# Patient Record
Sex: FEMALE | Race: WHITE | Employment: FULL TIME | URBAN - METROPOLITAN AREA
[De-identification: names, ages, dates, MRNs, and addresses within clinical notes are randomized per-mention and may not be internally consistent; named-entity substitution may affect disease eponyms.]

---

## 2020-01-28 ENCOUNTER — TELEPHONE (OUTPATIENT)
Dept: NEUROLOGY | Facility: CLINIC | Age: 45
End: 2020-01-28

## 2020-01-28 NOTE — TELEPHONE ENCOUNTER
[facility] Call Center    Phone Message    May a detailed message be left on voicemail: yes    Reason for Call: Other: Pt called and requested to speak with a nurse to get more information about the botox for migraine with Dr. Moyer. Please call back pt. Thanks.     Action Taken: Message routed to:  [facility] ([facility] NEURO

## 2020-01-29 NOTE — TELEPHONE ENCOUNTER
[facility] Call Center    Phone Message    May a detailed message be left on voicemail: yes    Reason for Call: Other: Felipa returning Maryam's call.  She states that the original rep did not capture her question correctly.  She has been recieving Botox treatments from Dr. Rodriguez at [facility] for the last 7 years and has to change providers due to a change in her insurance.  She states she is due to have her next injection around 3/17.  She has an appointment with Dr. Moyer on 3/18 for an initial consult.  She is hoping to have her Botox injection on that same day if possible or real close to that date.  Felipa was also put on the wait list in case there are any cancellations to have her consult sooner so she can then have her Botox on time.  Please call Felipa back to advise     Action Taken: Message routed to:  [facility] ([facility]  Neurology

## 2020-01-29 NOTE — TELEPHONE ENCOUNTER
"Patient is very concerned that she will not get to have an injection when she sees Dr. Moyer. She is due for her injection at the same time as her initial consult appointment with Dr. Moyer. Her insurance had changed and that is why she needed to get a different provider.  She has [employer]. I explained that usually the doctor sees the patient and then orders the medication and the prior authorization step proceeds and the patient obtains the injection at the next visit.     She is asking to be \"squeezed in\". I told her that I do not have any special hidden clinic appointments. She asked if Dr. Moyer sees patients in the evening or on the weekend. I told her that I do not do scheduling and what is shown is all that I know to exist.    She wants to know how other people handle this as this cannot be the first time this situation has come up. I told her I would let Yareli know on Monday and see if she had any recommendations. She looks forward to speaking with Yareli.  "

## 2020-01-29 NOTE — TELEPHONE ENCOUNTER
RECORDS RECEIVED FROM: Self   Date of Appt: 3/18/20   NOTES (FOR ALL VISITS) STATUS DETAILS   OFFICE NOTE from referring provider N/A    OFFICE NOTE from other specialist Care Everywhere Dr Rodriguez @ [facility]:  12/17/19 *botox*  9/18/19 *botox*  6/24/19 *botox*  6/24/19 4/1/19 *botox*  1/7/19 *botox*  9/27/18 *botox*  6/20/18 *botox*  6/20/18  (additional encounters in Care Everywhere)   DISCHARGE SUMMARY from hospital N/A    DISCHARGE REPORT from the ER N/A    OPERATIVE REPORT N/A    MEDICATION LIST Care Everywhere    IMAGING  (FOR ALL VISITS)     EMG N/A    EEG N/A    MRI (HEAD, NECK, SPINE) In process [facility]:  MRI Lumbar Spine 1/22/17  MRI Cervical Spine 1/20/17   LUMBAR PUNCTURE N/A    CARLITOS Scan N/A    CT (HEAD, NECK, SPINE) N/A       Action 1/29/20 MV 3.23pm   Action Taken Imaging request faxed to [facility] for:  MRI Lumbar Spine 1/22/17  MRI Cervical Spine 1/20/17     -3/12/2020-2nd request made via call for Images to [facility] for 1/22/2017 images and are now in PACS-MR @229pm

## 2020-01-29 NOTE — TELEPHONE ENCOUNTER
Called patient to discuss her questions. Left voice mail to go online to:    [email or web address]    Also said to call [phone] if she'd like to schedule a consult with Dr. Moyer.

## 2020-02-24 NOTE — TELEPHONE ENCOUNTER
[facility] Call Center    Phone Message    May a detailed message be left on voicemail: yes     Reason for Call: Other: Felipa calling back. She states someone was going to call her back to see if she could be seen sooner. Please call her back as soon as possible to discuss..     Action Taken: Message routed to:  [facility]  neuro     Travel Screening: Not Applicable

## 2020-03-18 ENCOUNTER — PRE VISIT (OUTPATIENT)
Dept: NEUROLOGY | Facility: CLINIC | Age: 45
End: 2020-03-18

## 2020-03-18 ENCOUNTER — OFFICE VISIT (OUTPATIENT)
Dept: NEUROLOGY | Facility: CLINIC | Age: 45
End: 2020-03-18
Payer: COMMERCIAL

## 2020-03-18 VITALS
DIASTOLIC BLOOD PRESSURE: 82 MMHG | OXYGEN SATURATION: 97 % | TEMPERATURE: 98.1 F | SYSTOLIC BLOOD PRESSURE: 122 MMHG | HEART RATE: 67 BPM | RESPIRATION RATE: 16 BRPM

## 2020-03-18 DIAGNOSIS — G43.E09 CHRONIC MIGRAINE WITH AURA: Primary | ICD-10-CM

## 2020-03-18 PROBLEM — G89.29 CHRONIC BILATERAL LOW BACK PAIN WITH RIGHT-SIDED SCIATICA: Status: ACTIVE | Noted: 2017-01-12

## 2020-03-18 PROBLEM — Z98.890 HISTORY OF LUMBAR LAMINECTOMY: Status: ACTIVE | Noted: 2018-06-20

## 2020-03-18 PROBLEM — M54.41 CHRONIC BILATERAL LOW BACK PAIN WITH RIGHT-SIDED SCIATICA: Status: ACTIVE | Noted: 2017-01-12

## 2020-03-18 PROBLEM — M54.2 NECK PAIN, CHRONIC: Status: ACTIVE | Noted: 2017-01-12

## 2020-03-18 PROBLEM — G89.29 NECK PAIN, CHRONIC: Status: ACTIVE | Noted: 2017-01-12

## 2020-03-18 RX ORDER — ONABOTULINUMTOXINA 200 [USP'U]/1
200 INJECTION, POWDER, LYOPHILIZED, FOR SOLUTION INTRADERMAL; INTRAMUSCULAR
Qty: 200 UNITS | Refills: 11
Start: 2020-03-18

## 2020-03-18 RX ORDER — COVID-19 ANTIGEN TEST
KIT MISCELLANEOUS
COMMUNITY

## 2020-03-18 RX ORDER — DM/P-EPHED/ACETAMINOPH/DOXYLAM
5000 LIQUID (ML) ORAL
COMMUNITY

## 2020-03-18 RX ORDER — METOCLOPRAMIDE 5 MG/1
TABLET ORAL
COMMUNITY
Start: 2019-06-24

## 2020-03-18 RX ORDER — RIZATRIPTAN BENZOATE 10 MG/1
TABLET, ORALLY DISINTEGRATING ORAL
COMMUNITY
Start: 2019-06-25 | End: 2020-03-18

## 2020-03-18 RX ORDER — ASCORBIC ACID 100 MG
TABLET,CHEWABLE ORAL
COMMUNITY

## 2020-03-18 RX ORDER — RIZATRIPTAN BENZOATE 10 MG/1
TABLET, ORALLY DISINTEGRATING ORAL
Qty: 18 TABLET | Refills: 11 | Status: SHIPPED | OUTPATIENT
Start: 2020-03-18

## 2020-03-18 ASSESSMENT — PAIN SCALES - GENERAL: PAINLEVEL: NO PAIN (0)

## 2020-03-18 NOTE — NURSING NOTE
Chief Complaint   Patient presents with     Headache     ump new headache consult        Pete Walter, EMT

## 2020-03-18 NOTE — LETTER
3/18/2020       RE: Felipa Castle  [address]  [place] [address]     Dear Colleague,    Thank you for referring your patient, Felipa Castle, to the [facility] NEUROLOGY at [facility]. Please see a copy of my visit note below.    [facility]   [facility]  Neurology Consult     Felipa Castle MRN# [id number]   YOB: 1975 Age: 44 year old     Requesting physician: self-referred         Assessment and Recommendations:   Felipa Castle is a 44 year old female who presents to the neurology clinic for further evaluation of headache.    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  Her neurologic exam is intact today.  She has had previous head imaging and 2013, which is reportedly normal.  I did not recommend any further work-up for secondary causes of headache today.  I suspect that she has migraine on a genetic basis, as there is a family history in her mother and a niece.    Going forward, we discussed continuing her previously successful symptomatic treatment strategy.  -For acute treatment of mild headache, she will continue naproxen 440 mg twice a day as needed, not to exceed more than 14 days/month to avoid medication overuse.  - For acute treatment of moderate to severe headache, she will continue rizatriptan 10 mg oral dissolvable tablet taken at the onset of headache, with a repeat dose in 2 hours if needed.  This should not exceed more than 9 days/month to avoid medication overuse.  - For nausea, she will continue metoclopramide 10 mg as needed, not to exceed more than 9 days/month.    For headache prevention, we will attempt to continue botulinum toxin injections, of these have been successful in reducing her headaches by least 50%.  She had her most recent set of injections earlier this week, and we will try to set these up for her in 12 weeks  "time.    I spent 35 minutes with the patient, over half of which was counseling.  I will plan to see her back for the first set of injections.    Shelley Moyer MD  Neurology  Pager: [phone]            Chief Complaint:   Chief Complaint   Patient presents with     Headache     [facility] new headache consult            History is obtained from the patient and medical record.      Felipa Castle is a 44 year old female who is been having headaches since October 2012.  They were initially daily for 6 months, and have remained frequent since that time.  Currently, she has between 6 and 10 severe headache days a month, with more mild headaches on other days.  This is in the setting of botulinum toxin injections for chronic migraine.  Prior to this, she was having greater than 15 headache days a month.    Her headaches generally start as neck tension and radiate forward to the front and top of her head bilaterally.  This used to be unilateral, but is gradually changed over time.  If headache is mild, she describes it as aching, this worsens to throbbing when severe.  Her headaches last at least 4 hours.  Her headache attacks are associated with photophobia, phonophobia, nausea, and sometimes vomiting.    She gets visual aura consisting of flashing lights and shadows.  She also experiences a smell like eggs or cooking.    She denies any positional component to her pain.  She denies autonomic features.  She denies any fever or other illness associated with her headaches.    For treatment, she currently takes naproxen for more mild headache as needed, rizatriptan for moderate to severe headache, and Reglan for nausea.      For prevention of headache, she currently receives botulinum toxin injections every 12 weeks using a chronic migraine protocol.  She tells me she gets 200 units with each set of injections.    Per chart review she receives the following units,    \"Procerus: 5 units in a single injection.   : 10 " "units total, 5 units per site.   Frontalis: 15 units total; divided in 4 sites.   Temporalis: 40 units total; divided in 8 sites.   Occipitalis: 60 units total; divided in 6 sites.   Cervical paraspinals: 40 units total; divided in 4 sites.   Trapezius: 30 units total; divided in 6 sites.\"    Per chart review, previous headache treatments have included topiramate which caused nephrolithiasis, trigger point injections, and hormonal management.  She has tried sumatriptan and rizatriptan in the past, which usually of benefit early on but then lose efficacy quickly.            Past Medical History:   Kidney stone  Low back pain/sciatica  Occasional constipation  Neck pain          Past Surgical History:   Low back surgery, diskectomy, laminectomy   Tonsillectomy   Urethra lengthened at age 5 due to recurrent UTI with pseudomonas  [place] teeth removed          Social History:   Unmarried, one child.  Employed as a .    Non-smoker, drinks alcohol occasionally, denies drug use.   Caffeine intake: 1 cup of coffee in the morning          Family History:   Mother, niece with migraine          Allergies:      Allergies   Allergen Reactions     Erythromycin Other (See Comments)     Other reaction(s): *Unknown - Childhood Rxn  PN: Unknown-too young       Minocycline Itching     PN: total body itching       Sulfamethoxazole-Trimethoprim Rash             Medications:     Current Outpatient Medications:      Ascorbic Acid (VITAMIN C) 100 MG CHEW, , Disp: , Rfl:      cholecalciferol (D 5000) 125 MCG (5000 UT) CAPS, Take 5,000 Units by mouth, Disp: , Rfl:      metoclopramide (REGLAN) 5 MG tablet, 1 tablet up to 2 times a day for nausea related migraine. Max 2-3 days per week or 9 days per month., Disp: , Rfl:      naproxen sodium 220 MG capsule, as needed., Disp: , Rfl:      rizatriptan (MAXALT-MLT) 10 MG ODT, DISSOLVE 1 TABLET ON THE TONGUE AT ONSET OF TYPICAL MIGRAINE. MAY REPEAT IN 1-2 HOURS. MAX OF 2 TABLETS PER DAY, " Disp: , Rfl:           Review of Systems:   Negative, except for shoulder tension, numbness on right lateral leg from previous back surgery, and as noted in the HPI.         Physical Exam:   /82   Pulse 67   Temp 98.1  F (36.7  C) (Oral)   Resp 16   SpO2 97%    Physical Exam:   General: NAD  Neurologic:   Mental Status Exam: Alert, awake and oriented to situation. No dysarthria. Speech of normal fluency.   Cranial Nerves: EOMs intact, no nystagmus, facial movements symmetric, hearing intact to conversation, tongue midline and fully mobile. No tongue atrophy or fasciculations.    Motor: No atrophy or fasciculations.  Able to hold all limbs against gravity.  Able to toe and heel walk.  Able to stand from a sitting position without use of arms.  No tremors or abnormal movements noted.    Coordination: Finger-nose-finger intact bilaterally.  Rapidly alternating movements intact bilaterally in the upper extremities.  Normal finger tapping bilaterally.  Normal Romberg.   Reflexes: 2+ and symmetric in triceps, biceps, brachioradialis, patellar.   Gait: Normal gait.  Able to toe and heel walk.  Tandem gait normal.  Head: Normocephalic, atraumatic.  Neck: Normal range of motion with lateral head movements and neck flexion.  Eyes: No conjunctival injection, no scleral icterus.   Respiratory: No increased work of breathing.  Cardiovascular: No lower extremity edema.          Data:     MRI of the brain with and without contrast completed 01/02/2013 interpreted as normal.           Again, thank you for allowing me to participate in the care of your patient.      Sincerely,    Shelley Moyer MD

## 2020-03-18 NOTE — PROGRESS NOTES
[facility]  Neurology Consult     Felipa Castle MRN# [id number]   YOB: 1975 Age: 44 year old     Requesting physician: self-referred         Assessment and Recommendations:   Felipa Castle is a 44 year old female who presents to the neurology clinic for further evaluation of headache.    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  Her neurologic exam is intact today.  She has had previous head imaging and 2013, which is reportedly normal.  I did not recommend any further work-up for secondary causes of headache today.  I suspect that she has migraine on a genetic basis, as there is a family history in her mother and a niece.    Going forward, we discussed continuing her previously successful symptomatic treatment strategy.  -For acute treatment of mild headache, she will continue naproxen 440 mg twice a day as needed, not to exceed more than 14 days/month to avoid medication overuse.  - For acute treatment of moderate to severe headache, she will continue rizatriptan 10 mg oral dissolvable tablet taken at the onset of headache, with a repeat dose in 2 hours if needed.  This should not exceed more than 9 days/month to avoid medication overuse.  - For nausea, she will continue metoclopramide 10 mg as needed, not to exceed more than 9 days/month.    For headache prevention, we will attempt to continue botulinum toxin injections, of these have been successful in reducing her headaches by least 50%.  She had her most recent set of injections earlier this week, and we will try to set these up for her in 12 weeks time.    I spent 35 minutes with the patient, over half of which was counseling.  I will plan to see her back for the first set of injections.    Shelley Moyer MD  Neurology  Pager: [phone]            Chief Complaint:   Chief Complaint   Patient presents with     Headache     [facility]  "new headache consult            History is obtained from the patient and medical record.      Felipa Castle is a 44 year old female who is been having headaches since October 2012.  They were initially daily for 6 months, and have remained frequent since that time.  Currently, she has between 6 and 10 severe headache days a month, with more mild headaches on other days.  This is in the setting of botulinum toxin injections for chronic migraine.  Prior to this, she was having greater than 15 headache days a month.    Her headaches generally start as neck tension and radiate forward to the front and top of her head bilaterally.  This used to be unilateral, but is gradually changed over time.  If headache is mild, she describes it as aching, this worsens to throbbing when severe.  Her headaches last at least 4 hours.  Her headache attacks are associated with photophobia, phonophobia, nausea, and sometimes vomiting.    She gets visual aura consisting of flashing lights and shadows.  She also experiences a smell like eggs or cooking.    She denies any positional component to her pain.  She denies autonomic features.  She denies any fever or other illness associated with her headaches.    For treatment, she currently takes naproxen for more mild headache as needed, rizatriptan for moderate to severe headache, and Reglan for nausea.      For prevention of headache, she currently receives botulinum toxin injections every 12 weeks using a chronic migraine protocol.  She tells me she gets 200 units with each set of injections.    Per chart review she receives the following units,    \"Procerus: 5 units in a single injection.   : 10 units total, 5 units per site.   Frontalis: 15 units total; divided in 4 sites.   Temporalis: 40 units total; divided in 8 sites.   Occipitalis: 60 units total; divided in 6 sites.   Cervical paraspinals: 40 units total; divided in 4 sites.   Trapezius: 30 units total; divided in 6 " "sites.\"    Per chart review, previous headache treatments have included topiramate which caused nephrolithiasis, trigger point injections, and hormonal management.  She has tried sumatriptan and rizatriptan in the past, which usually of benefit early on but then lose efficacy quickly.            Past Medical History:   Kidney stone  Low back pain/sciatica  Occasional constipation  Neck pain          Past Surgical History:   Low back surgery, diskectomy, laminectomy   Tonsillectomy   Urethra lengthened at age 5 due to recurrent UTI with pseudomonas  [place] teeth removed          Social History:   Unmarried, one child.  Employed as a .    Non-smoker, drinks alcohol occasionally, denies drug use.   Caffeine intake: 1 cup of coffee in the morning          Family History:   Mother, niece with migraine          Allergies:      Allergies   Allergen Reactions     Erythromycin Other (See Comments)     Other reaction(s): *Unknown - Childhood Rxn  PN: Unknown-too young       Minocycline Itching     PN: total body itching       Sulfamethoxazole-Trimethoprim Rash             Medications:     Current Outpatient Medications:      Ascorbic Acid (VITAMIN C) 100 MG CHEW, , Disp: , Rfl:      cholecalciferol (D 5000) 125 MCG (5000 UT) CAPS, Take 5,000 Units by mouth, Disp: , Rfl:      metoclopramide (REGLAN) 5 MG tablet, 1 tablet up to 2 times a day for nausea related migraine. Max 2-3 days per week or 9 days per month., Disp: , Rfl:      naproxen sodium 220 MG capsule, as needed., Disp: , Rfl:      rizatriptan (MAXALT-MLT) 10 MG ODT, DISSOLVE 1 TABLET ON THE TONGUE AT ONSET OF TYPICAL MIGRAINE. MAY REPEAT IN 1-2 HOURS. MAX OF 2 TABLETS PER DAY, Disp: , Rfl:           Review of Systems:   Negative, except for shoulder tension, numbness on right lateral leg from previous back surgery, and as noted in the HPI.         Physical Exam:   /82   Pulse 67   Temp 98.1  F (36.7  C) (Oral)   Resp 16   SpO2 97%    Physical Exam: "   General: NAD  Neurologic:   Mental Status Exam: Alert, awake and oriented to situation. No dysarthria. Speech of normal fluency.   Cranial Nerves: EOMs intact, no nystagmus, facial movements symmetric, hearing intact to conversation, tongue midline and fully mobile. No tongue atrophy or fasciculations.    Motor: No atrophy or fasciculations.  Able to hold all limbs against gravity.  Able to toe and heel walk.  Able to stand from a sitting position without use of arms.  No tremors or abnormal movements noted.    Coordination: Finger-nose-finger intact bilaterally.  Rapidly alternating movements intact bilaterally in the upper extremities.  Normal finger tapping bilaterally.  Normal Romberg.   Reflexes: 2+ and symmetric in triceps, biceps, brachioradialis, patellar.   Gait: Normal gait.  Able to toe and heel walk.  Tandem gait normal.  Head: Normocephalic, atraumatic.  Neck: Normal range of motion with lateral head movements and neck flexion.  Eyes: No conjunctival injection, no scleral icterus.   Respiratory: No increased work of breathing.  Cardiovascular: No lower extremity edema.          Data:     MRI of the brain with and without contrast completed 01/02/2013 interpreted as normal.

## 2020-03-18 NOTE — LETTER
3/18/2020      RE: Felipa Castle  [address]  [facility] [address]       [facility]  Neurology Consult     Felipa Castle MRN# [id number]   YOB: 1975 Age: 44 year old     Requesting physician: self-referred         Assessment and Recommendations:   Felipa Castle is a 44 year old female who presents to the neurology clinic for further evaluation of headache.    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  Her neurologic exam is intact today.  She has had previous head imaging and 2013, which is reportedly normal.  I did not recommend any further work-up for secondary causes of headache today.  I suspect that she has migraine on a genetic basis, as there is a family history in her mother and a niece.    Going forward, we discussed continuing her previously successful symptomatic treatment strategy.  -For acute treatment of mild headache, she will continue naproxen 440 mg twice a day as needed, not to exceed more than 14 days/month to avoid medication overuse.  - For acute treatment of moderate to severe headache, she will continue rizatriptan 10 mg oral dissolvable tablet taken at the onset of headache, with a repeat dose in 2 hours if needed.  This should not exceed more than 9 days/month to avoid medication overuse.  - For nausea, she will continue metoclopramide 10 mg as needed, not to exceed more than 9 days/month.    For headache prevention, we will attempt to continue botulinum toxin injections, of these have been successful in reducing her headaches by least 50%.  She had her most recent set of injections earlier this week, and we will try to set these up for her in 12 weeks time.    I spent 35 minutes with the patient, over half of which was counseling.  I will plan to see her back for the first set of injections.    Shelley Moyer MD  Neurology  Pager: [phone]             "Chief Complaint:   Chief Complaint   Patient presents with     Headache     [facility] new headache consult            History is obtained from the patient and medical record.      Felipa Castle is a 44 year old female who is been having headaches since October 2012.  They were initially daily for 6 months, and have remained frequent since that time.  Currently, she has between 6 and 10 severe headache days a month, with more mild headaches on other days.  This is in the setting of botulinum toxin injections for chronic migraine.  Prior to this, she was having greater than 15 headache days a month.    Her headaches generally start as neck tension and radiate forward to the front and top of her head bilaterally.  This used to be unilateral, but is gradually changed over time.  If headache is mild, she describes it as aching, this worsens to throbbing when severe.  Her headaches last at least 4 hours.  Her headache attacks are associated with photophobia, phonophobia, nausea, and sometimes vomiting.    She gets visual aura consisting of flashing lights and shadows.  She also experiences a smell like eggs or cooking.    She denies any positional component to her pain.  She denies autonomic features.  She denies any fever or other illness associated with her headaches.    For treatment, she currently takes naproxen for more mild headache as needed, rizatriptan for moderate to severe headache, and Reglan for nausea.      For prevention of headache, she currently receives botulinum toxin injections every 12 weeks using a chronic migraine protocol.  She tells me she gets 200 units with each set of injections.    Per chart review she receives the following units,    \"Procerus: 5 units in a single injection.   : 10 units total, 5 units per site.   Frontalis: 15 units total; divided in 4 sites.   Temporalis: 40 units total; divided in 8 sites.   Occipitalis: 60 units total; divided in 6 sites.   Cervical paraspinals: " "40 units total; divided in 4 sites.   Trapezius: 30 units total; divided in 6 sites.\"    Per chart review, previous headache treatments have included topiramate which caused nephrolithiasis, trigger point injections, and hormonal management.  She has tried sumatriptan and rizatriptan in the past, which usually of benefit early on but then lose efficacy quickly.            Past Medical History:   Kidney stone  Low back pain/sciatica  Occasional constipation  Neck pain          Past Surgical History:   Low back surgery, diskectomy, laminectomy   Tonsillectomy   Urethra lengthened at age 5 due to recurrent UTI with pseudomonas  [place] teeth removed          Social History:   Unmarried, one child.  Employed as a .    Non-smoker, drinks alcohol occasionally, denies drug use.   Caffeine intake: 1 cup of coffee in the morning          Family History:   Mother, niece with migraine          Allergies:      Allergies   Allergen Reactions     Erythromycin Other (See Comments)     Other reaction(s): *Unknown - Childhood Rxn  PN: Unknown-too young       Minocycline Itching     PN: total body itching       Sulfamethoxazole-Trimethoprim Rash             Medications:     Current Outpatient Medications:      Ascorbic Acid (VITAMIN C) 100 MG CHEW, , Disp: , Rfl:      cholecalciferol (D 5000) 125 MCG (5000 UT) CAPS, Take 5,000 Units by mouth, Disp: , Rfl:      metoclopramide (REGLAN) 5 MG tablet, 1 tablet up to 2 times a day for nausea related migraine. Max 2-3 days per week or 9 days per month., Disp: , Rfl:      naproxen sodium 220 MG capsule, as needed., Disp: , Rfl:      rizatriptan (MAXALT-MLT) 10 MG ODT, DISSOLVE 1 TABLET ON THE TONGUE AT ONSET OF TYPICAL MIGRAINE. MAY REPEAT IN 1-2 HOURS. MAX OF 2 TABLETS PER DAY, Disp: , Rfl:           Review of Systems:   Negative, except for shoulder tension, numbness on right lateral leg from previous back surgery, and as noted in the HPI.         Physical Exam:   /82   " Pulse 67   Temp 98.1  F (36.7  C) (Oral)   Resp 16   SpO2 97%    Physical Exam:   General: NAD  Neurologic:   Mental Status Exam: Alert, awake and oriented to situation. No dysarthria. Speech of normal fluency.   Cranial Nerves: EOMs intact, no nystagmus, facial movements symmetric, hearing intact to conversation, tongue midline and fully mobile. No tongue atrophy or fasciculations.    Motor: No atrophy or fasciculations.  Able to hold all limbs against gravity.  Able to toe and heel walk.  Able to stand from a sitting position without use of arms.  No tremors or abnormal movements noted.    Coordination: Finger-nose-finger intact bilaterally.  Rapidly alternating movements intact bilaterally in the upper extremities.  Normal finger tapping bilaterally.  Normal Romberg.   Reflexes: 2+ and symmetric in triceps, biceps, brachioradialis, patellar.   Gait: Normal gait.  Able to toe and heel walk.  Tandem gait normal.  Head: Normocephalic, atraumatic.  Neck: Normal range of motion with lateral head movements and neck flexion.  Eyes: No conjunctival injection, no scleral icterus.   Respiratory: No increased work of breathing.  Cardiovascular: No lower extremity edema.          Data:     MRI of the brain with and without contrast completed 01/02/2013 interpreted as normal.           Shelley Moyer MD

## 2020-06-08 ENCOUNTER — OFFICE VISIT (OUTPATIENT)
Dept: NEUROLOGY | Facility: CLINIC | Age: 45
End: 2020-06-08
Payer: COMMERCIAL

## 2020-06-08 DIAGNOSIS — G43.E09 CHRONIC MIGRAINE WITH AURA: Primary | ICD-10-CM

## 2020-06-08 NOTE — PROGRESS NOTES
[place], [place]  Botulinum Toxin Procedure    Shelley Moyer MD  Neurology    June 8, 2020    Procedure:  OnabotulinumtoxinA injections for chronic migraine  Indication:  Chronic migraine    Ms. Castle suffers from severe intractable headaches.  She was referred by Dr. Moyer for onabotulinumtoxinA injections for headache.  Risks, benefits, and alternatives were discussed.  All questions were answered and consent given.  She decided to proceed with the injections.     Prior to initiation of botulinum toxin injections, Ms. Castle reported 30 headache days per month. Her headaches are quite disabling and often interfere with her ability to function normally.    Previous headache treatments have included topiramate which caused nephrolithiasis, trigger point injections, and hormonal management.    She currently takes no other treatments for headache prevention.    Ms. Vizcainos pain was assessed prior to the procedure.  She rated her pain today as 0 out of 10.    Procedural Pause: Procedural pause was conducted to verify correct patient identity, procedure to be performed, correct side and site, correct patient position, and special requirements. Appropriate hand hygiene was utilized, and each injection site was prepped with alcohol wipes or Chloraprep swab.     Procedure Details: From lot number [id number], expiration date 11/2022, 200 units of onabotulinumtoxinA was diluted in 4 mL 0.9% normal saline, lot # -[id number]. A total of 150 units of onabotulinumtoxinA were injected using 30 gauge 0.5 in needles into the muscles listed below. 50 units of onabotulinumtoxinA were wasted.     Injection Sites: Total = 200 units onabotulinumtoxinA      and Procerus muscles - 5 units into the left and right corrugators and 5 units into the procerus (15 units total)    Frontalis muscles - 5 units into the left superior frontalis and 5 units into the right superior frontalis (2 injection  sites per muscle) (10 units total)    Temporalis muscles - 25 units into the left temporalis muscle and 25 units into the right temporalis muscle (4 injection sites per muscle) (50 units total)    Occipitalis muscles - 25 units into the left occipitalis muscle and 25 units into the right occipitalis muscle (4 injection sites per muscle) (50 units total)    Splenius Capitis muscles - 12.5 units into the left splenius capitis muscle and 12.5 units into the right splenius capitis muscle (2 injection sites per muscle, divided into 2/3 anteriorly and 1/3 posteriorly) (25 units total)      Trapezius muscles - 25 units into the left trapezius muscle and 25 units into the right trapezius muscle (3 injection sites per muscle, divided 5 units, 10 units, 10 units, medial to lateral) (50 units total)    Ms. Castle tolerated the procedure well without immediate complications.  She will follow up in clinic for assessment of the effectiveness of treatment.  She did not report any change in her pain level after the botulinumtoxinA injection procedure.    Shelley Moyer MD  Pager: [phone]    Neurology Department  [facility]  Clinics and Surgery [address] [address]

## 2020-06-08 NOTE — LETTER
6/8/2020       RE: Felipa Castle  [address]  [place] [address]     Dear Colleague,    Thank you for referring your patient, Felipa Castle, to the [facility] NEUROLOGY at [facility]. Please see a copy of my visit note below.    [place], [place]  Botulinum Toxin Procedure    Shelley Moyer MD  Neurology    June 8, 2020    Procedure:  OnabotulinumtoxinA injections for chronic migraine  Indication:  Chronic migraine    Ms. Castle suffers from severe intractable headaches.  She was referred by Dr. Moyer for onabotulinumtoxinA injections for headache.  Risks, benefits, and alternatives were discussed.  All questions were answered and consent given.  She decided to proceed with the injections.     Prior to initiation of botulinum toxin injections, Ms. Castle reported 30 headache days per month. Her headaches are quite disabling and often interfere with her ability to function normally.    Previous headache treatments have included topiramate which caused nephrolithiasis, trigger point injections, and hormonal management.    She currently takes no other treatments for headache prevention.    Ms. Vizcainos pain was assessed prior to the procedure.  She rated her pain today as 0 out of 10.    Procedural Pause: Procedural pause was conducted to verify correct patient identity, procedure to be performed, correct side and site, correct patient position, and special requirements. Appropriate hand hygiene was utilized, and each injection site was prepped with alcohol wipes or Chloraprep swab.     Procedure Details: From lot number [id number], expiration date 11/2022, 200 units of onabotulinumtoxinA was diluted in 4 mL 0.9% normal saline, lot # -[id number]. A total of 150 units of onabotulinumtoxinA were injected using 30 gauge 0.5 in needles into the muscles listed below. 50 units of onabotulinumtoxinA were wasted.     Injection Sites: Total = 200  units onabotulinumtoxinA      and Procerus muscles - 5 units into the left and right corrugators and 5 units into the procerus (15 units total)    Frontalis muscles - 5 units into the left superior frontalis and 5 units into the right superior frontalis (2 injection sites per muscle) (10 units total)    Temporalis muscles - 25 units into the left temporalis muscle and 25 units into the right temporalis muscle (4 injection sites per muscle) (50 units total)    Occipitalis muscles - 25 units into the left occipitalis muscle and 25 units into the right occipitalis muscle (4 injection sites per muscle) (50 units total)    Splenius Capitis muscles - 12.5 units into the left splenius capitis muscle and 12.5 units into the right splenius capitis muscle (2 injection sites per muscle, divided into 2/3 anteriorly and 1/3 posteriorly) (25 units total)      Trapezius muscles - 25 units into the left trapezius muscle and 25 units into the right trapezius muscle (3 injection sites per muscle, divided 5 units, 10 units, 10 units, medial to lateral) (50 units total)    Ms. Castle tolerated the procedure well without immediate complications.  She will follow up in clinic for assessment of the effectiveness of treatment.  She did not report any change in her pain level after the botulinumtoxinA injection procedure.    Shelley Moyer MD  Pager: [phone]    Neurology Department  [facility]  Clinics and Surgery [address] [address]      Again, thank you for allowing me to participate in the care of your patient.      Sincerely,    Shelley Moyer MD

## 2020-08-10 ENCOUNTER — TELEPHONE (OUTPATIENT)
Dept: NEUROLOGY | Facility: CLINIC | Age: 45
End: 2020-08-10

## 2020-08-10 NOTE — TELEPHONE ENCOUNTER
[facility] Call Center    Phone Message    May a detailed message be left on voicemail: yes     Reason for Call: Other: Pt has to go out of town for a month and she would like to reschedule her botox appt for a week earlier. She has to go out of town for work. Please call to discuss. She says she can't miss this appt.     Action Taken: Message routed to:  [facility] ([facility] neurology    Travel Screening: Not Applicable

## 2020-08-12 NOTE — TELEPHONE ENCOUNTER
[facility] Call Center    Phone Message    May a detailed message be left on voicemail: yes     Reason for Call: Other: Felipa calling to reschedule her botox appointment that is on 8/31/20 due to leaving on 8/27/20 for work and will be gone for a month. She is trying to get in a week earlier. Please call her at your earliest convenience to discuss.     Action Taken: Message routed to:  [facility] ([facility]):  NEUROLOGY    Travel Screening: Not Applicable

## 2020-08-14 ENCOUNTER — TELEPHONE (OUTPATIENT)
Dept: NEUROLOGY | Facility: CLINIC | Age: 45
End: 2020-08-14

## 2020-08-14 DIAGNOSIS — G43.E09 CHRONIC MIGRAINE WITH AURA: Primary | ICD-10-CM

## 2020-08-14 RX ORDER — METHYLPREDNISOLONE 4 MG
TABLET, DOSE PACK ORAL
Qty: 21 TABLET | Refills: 0 | Status: SHIPPED | OUTPATIENT
Start: 2020-08-14

## 2020-08-14 NOTE — TELEPHONE ENCOUNTER
"I called pt regarding her botox. She cannot have her dose early as she needs to wait 12 weeks between each dose for insurance to cover cost. Dr. Gage had a couple options for her, \"She could have occipital nerve blocks before leaving to try to cover her, or I could prescribe her a medrol claritza with instructions to use it only if she has a flare while she is out of town.\". I reviewed this with pt. She would like to try to schedule the nerve block; I will check with Dr. Moyer to see when we can get the patient seen.     Lena RN  "

## 2020-08-14 NOTE — TELEPHONE ENCOUNTER
I called pt and helped her schedule as an add-on on 8/21 at 3pm with Dr. Moyer for an occipital nerve block.     Lena ZAMBRANO

## 2020-08-21 ENCOUNTER — OFFICE VISIT (OUTPATIENT)
Dept: PHYSICAL MEDICINE AND REHAB | Facility: CLINIC | Age: 45
End: 2020-08-21
Payer: COMMERCIAL

## 2020-08-21 DIAGNOSIS — M54.81 BILATERAL OCCIPITAL NEURALGIA: Primary | ICD-10-CM

## 2020-08-21 NOTE — PROGRESS NOTES
[facility]  Physical Medicine & Rehabilitation Consultation    Felipa Castle   YOB: 1975  MRN: [id number]   Date of Service: 08/21/20    Requesting Physician: GLORIA Roque [facility] Neurology       History of Present Illness:  Felipa Castle is a 45 year old female with a history of chronic intractable headaches. For headache management, she receives Botox injections every 12 weeks. For acute migraine treatment, she uses naproxen for mild headaches, and rizatriptan for severe headaches. She uses metoclopramide for nausea associated with her headaches.     Today, she presents for occipital nerve blocks for management of occipital neuralgia. She is referred by Dr. Moyer. She currently reports a headache rated 5/10.       Physical Examination:  VS: There were no vitals taken for this visit.     Pleasant and cooperative, in no acute distress  No lesions or abrasions on exposed skin      Procedure:     Drug:   Bupivacaine 0.5%: 7 ml (Lot: [id number], Exp: 10/23, NDC: 76642-686-92)  Lidocaine 1%: 2 ml (Lot: [id number], Exp: 05/23, NDC: 27060-706-31)  Triamcinolone Acetonide: 40 mg = 1 ml (Lot: [id number], Exp: 04/22, NDC: [address]-0)    Prior to the start of the procedure and with procedural staff participation, I verbally confirmed the patient s identity using two indicators, relevant allergies, that the procedure was appropriate and matched the consent or emergent situation, and that the correct equipment/implants were available. Immediately prior to starting the procedure I conducted the Time Out with the procedural staff and re-confirmed the patient s name, procedure, and site/side. (The Joint Commission universal protocol was followed.)  Yes    Sedation (Moderate or Deep): None      Right and Left greater occipital nerve block    Area just inferior to insertion of the right and left superior trapezius insertion onto skull was cleansed with  ChloraPrep. Needle was advanced anteriorly to base of skull then slightly withdrawn and injectate was injected in a fan-like distribution at different depths. Total injection volume of 5 ml per side.       Assessment & Recommendations:  Felipa Castle is a 45 year old female who presents to rehabilitation clinic for bilateral occipital nerve blocks for management of occipital neuralgia.  Felipa Castle tolerated the procedure well and noted reduction in baseline pain following the procedure.       Thank you for this consultation. Please do not hesitate to contact me with further questions.   Yumiko West MD  Physical Medicine and Rehabilitation  [phone]    I spent a total of 10 minutes, face-to-face and managing the care of Felipa Castle, excluding the procedure. Over 50% of my time was spent counseling the patient and coordinating care. Please see note for details.

## 2020-08-21 NOTE — LETTER
8/21/2020     RE: Felipa Castle  [address]  [place] [address]     Dear Colleague,    Thank you for referring your patient, Felipa Castle, to the [facility] PHYSICAL MEDICINE AND REHABILITATION at [facility]. Please see a copy of my visit note below.    [facility]  Clinics & Surgery Center  Physical Medicine & Rehabilitation Consultation    Felipa Castle   YOB: 1975  MRN: [id number]   Date of Service: 08/21/20    Requesting Physician: Dr. Moyer [facility]       History of Present Illness:  Felipa Castle is a 45 year old female with a history of chronic intractable headaches. For headache management, she receives Botox injections every 12 weeks. For acute migraine treatment, she uses naproxen for mild headaches, and rizatriptan for severe headaches. She uses metoclopramide for nausea associated with her headaches.     Today, she presents for occipital nerve blocks for management of occipital neuralgia. She is referred by Dr. Moyer. She currently reports a headache rated 5/10.       Physical Examination:  VS: There were no vitals taken for this visit.     Pleasant and cooperative, in no acute distress  No lesions or abrasions on exposed skin      Procedure:     Drug:   Bupivacaine 0.5%: 7 ml (Lot: [id number], Exp: 10/23, NDC: 59332-399-66)  Lidocaine 1%: 2 ml (Lot: [id number], Exp: 05/23, NDC: 35177-929-95)  Triamcinolone Acetonide: 40 mg = 1 ml (Lot: [id number], Exp: 04/22, NDC: [address]-6)    Prior to the start of the procedure and with procedural staff participation, I verbally confirmed the patient s identity using two indicators, relevant allergies, that the procedure was appropriate and matched the consent or emergent situation, and that the correct equipment/implants were available. Immediately prior to starting the procedure I conducted the Time Out with the procedural staff and re-confirmed the  patient s name, procedure, and site/side. (The Joint Commission universal protocol was followed.)  Yes    Sedation (Moderate or Deep): None    Right and Left greater occipital nerve block    Area just inferior to insertion of the right and left superior trapezius insertion onto skull was cleansed with ChloraPrep. Needle was advanced anteriorly to base of skull then slightly withdrawn and injectate was injected in a fan-like distribution at different depths. Total injection volume of 5 ml per side.       Assessment & Recommendations:  Felipa Castle is a 45 year old female who presents to rehabilitation clinic for bilateral occipital nerve blocks for management of occipital neuralgia.  Felipa Castle tolerated the procedure well and noted reduction in baseline pain following the procedure.     Thank you for this consultation. Please do not hesitate to contact me with further questions.   Yumiko West MD  Physical Medicine and Rehabilitation  [phone]    I spent a total of 10 minutes, face-to-face and managing the care of Felipa Castle, excluding the procedure. Over 50% of my time was spent counseling the patient and coordinating care. Please see note for details.     Again, thank you for allowing me to participate in the care of your patient.      Sincerely,    Yumiko West MD

## 2020-09-08 RX ORDER — BUPIVACAINE HYDROCHLORIDE 5 MG/ML
7 INJECTION, SOLUTION EPIDURAL; INTRACAUDAL ONCE
Status: COMPLETED | OUTPATIENT
Start: 2020-09-08 | End: 2020-09-08

## 2020-09-08 RX ORDER — TRIAMCINOLONE ACETONIDE 40 MG/ML
40 INJECTION, SUSPENSION INTRA-ARTICULAR; INTRAMUSCULAR ONCE
Status: COMPLETED | OUTPATIENT
Start: 2020-09-08 | End: 2020-09-08

## 2020-09-08 RX ADMIN — BUPIVACAINE HYDROCHLORIDE 35 MG: 5 INJECTION, SOLUTION EPIDURAL; INTRACAUDAL at 10:55

## 2020-09-08 RX ADMIN — TRIAMCINOLONE ACETONIDE 40 MG: 40 INJECTION, SUSPENSION INTRA-ARTICULAR; INTRAMUSCULAR at 10:56

## 2020-09-25 ENCOUNTER — OFFICE VISIT (OUTPATIENT)
Dept: NEUROLOGY | Facility: CLINIC | Age: 45
End: 2020-09-25
Payer: COMMERCIAL

## 2020-09-25 DIAGNOSIS — G43.E09 CHRONIC MIGRAINE WITH AURA: Primary | ICD-10-CM

## 2020-09-25 NOTE — PROGRESS NOTES
[place], [place]  Botulinum Toxin Procedure    Shelley Moyer MD  Neurology    September 25, 2020    Procedure:  OnabotulinumtoxinA injections for chronic migraine  Indication:  Chronic migraine    Ms. Castle suffers from severe intractable headaches.  She was referred by Dr. Moyer for onabotulinumtoxinA injections for headache.  Risks, benefits, and alternatives were discussed.  All questions were answered and consent given.  She decided to proceed with the injections.     Prior to initiation of botulinum toxin injections, Ms. Castle reported 30 headache days per month, with 30 severe headache days per month. Her headaches are quite disabling and often interfere with her ability to function normally.    Ms. Castle reports 4-8 headache days per month currently, with 3-4 severe headache days per month.  She has noticed a wearing off phenomenon prior to this round of botulinum toxin injections, lasting 1 week.    Previous headache treatments have included topiramate which caused nephrolithiasis, trigger point injections, and hormonal management.     She currently takes no other treatments for headache prevention.  She receives occipital nerve blocks for occipital neuralgia.    Ms. Vizcainos pain was assessed prior to the procedure.  She rated her pain today as 0 out of 10.    Procedural Pause: Procedural pause was conducted to verify correct patient identity, procedure to be performed, correct side and site, correct patient position, and special requirements. Appropriate hand hygiene was utilized, and each injection site was prepped with alcohol wipes or Chloraprep swab.     Procedure Details: From lot number [id number], expiration date 5/2023, 200 units of onabotulinumtoxinA was diluted in 4 mL 0.9% normal saline. A total of 200 units of onabotulinumtoxinA were injected using 30 gauge 0.5 in needles into the muscles listed below. 0 units of onabotulinumtoxinA were wasted.      Injection Sites: Total = 200 units onabotulinumtoxinA       and Procerus muscles - 5 units into the left and right corrugators and 5 units into the procerus (15 units total)     Frontalis muscles - 5 units into the left superior frontalis and 5 units into the right superior frontalis (2 injection sites per muscle) (10 units total)     Temporalis muscles - 25 units into the left temporalis muscle and 25 units into the right temporalis muscle (4 injection sites per muscle) (50 units total)     Occipitalis muscles - 25 units into the left occipitalis muscle and 25 units into the right occipitalis muscle (4 injection sites per muscle) (50 units total)     Splenius Capitis muscles - 12.5 units into the left splenius capitis muscle and 12.5 units into the right splenius capitis muscle (2 injection sites per muscle, divided into 2/3 anteriorly and 1/3 posteriorly) (25 units total)                 Trapezius muscles - 25 units into the left trapezius muscle and 25 units into the right trapezius muscle (3 injection sites per muscle, divided 5 units, 10 units, 10 units, medial to lateral) (50 units total)    Ms. Castle tolerated the procedure well without immediate complications.  She will follow up in clinic for assessment of the effectiveness of treatment.  She did not report any change in her pain level after the botulinumtoxinA injection procedure.    Shelley Moyer MD  Pager: [phone]    Neurology Department  [facility] [address] [address]

## 2020-09-25 NOTE — LETTER
9/25/2020     RE: Felipa Castle  [address]  [place] [address]     Dear Colleague,    Thank you for referring your patient, Felipa Castle, to the [facility] NEUROLOGY at [facility]. Please see a copy of my visit note below.    [place], [place]  Botulinum Toxin Procedure    Shelley Moyer MD  Neurology    September 25, 2020    Procedure:  OnabotulinumtoxinA injections for chronic migraine  Indication:  Chronic migraine    Ms. Castle suffers from severe intractable headaches.  She was referred by Dr. Moyer for onabotulinumtoxinA injections for headache.  Risks, benefits, and alternatives were discussed.  All questions were answered and consent given.  She decided to proceed with the injections.     Prior to initiation of botulinum toxin injections, Ms. Castle reported 30 headache days per month, with 30 severe headache days per month. Her headaches are quite disabling and often interfere with her ability to function normally.    Ms. Castle reports 4-8 headache days per month currently, with 3-4 severe headache days per month.  She has noticed a wearing off phenomenon prior to this round of botulinum toxin injections, lasting 1 week.    Previous headache treatments have included topiramate which caused nephrolithiasis, trigger point injections, and hormonal management.     She currently takes no other treatments for headache prevention.  She receives occipital nerve blocks for occipital neuralgia.    Ms. Vizcainos pain was assessed prior to the procedure.  She rated her pain today as 0 out of 10.    Procedural Pause: Procedural pause was conducted to verify correct patient identity, procedure to be performed, correct side and site, correct patient position, and special requirements. Appropriate hand hygiene was utilized, and each injection site was prepped with alcohol wipes or Chloraprep swab.     Procedure Details: From lot number  [id number], expiration date 5/2023, 200 units of onabotulinumtoxinA was diluted in 4 mL 0.9% normal saline. A total of 200 units of onabotulinumtoxinA were injected using 30 gauge 0.5 in needles into the muscles listed below. 0 units of onabotulinumtoxinA were wasted.     Injection Sites: Total = 200 units onabotulinumtoxinA       and Procerus muscles - 5 units into the left and right corrugators and 5 units into the procerus (15 units total)     Frontalis muscles - 5 units into the left superior frontalis and 5 units into the right superior frontalis (2 injection sites per muscle) (10 units total)     Temporalis muscles - 25 units into the left temporalis muscle and 25 units into the right temporalis muscle (4 injection sites per muscle) (50 units total)     Occipitalis muscles - 25 units into the left occipitalis muscle and 25 units into the right occipitalis muscle (4 injection sites per muscle) (50 units total)     Splenius Capitis muscles - 12.5 units into the left splenius capitis muscle and 12.5 units into the right splenius capitis muscle (2 injection sites per muscle, divided into 2/3 anteriorly and 1/3 posteriorly) (25 units total)                 Trapezius muscles - 25 units into the left trapezius muscle and 25 units into the right trapezius muscle (3 injection sites per muscle, divided 5 units, 10 units, 10 units, medial to lateral) (50 units total)    Ms. Castle tolerated the procedure well without immediate complications.  She will follow up in clinic for assessment of the effectiveness of treatment.  She did not report any change in her pain level after the botulinumtoxinA injection procedure.    Shelley Moyer MD  Pager: [phone]    Neurology Department  [facility] [address] [address]

## 2020-12-18 ENCOUNTER — OFFICE VISIT (OUTPATIENT)
Dept: NEUROLOGY | Facility: CLINIC | Age: 45
End: 2020-12-18
Payer: COMMERCIAL

## 2020-12-18 DIAGNOSIS — G43.E09 CHRONIC MIGRAINE WITH AURA: Primary | ICD-10-CM

## 2020-12-18 PROCEDURE — 64615 CHEMODENERV MUSC MIGRAINE: CPT | Performed by: PSYCHIATRY & NEUROLOGY

## 2020-12-18 NOTE — LETTER
12/18/2020       RE: Felipa Castle  [address]  [place] [address]     Dear Colleague,    Thank you for referring your patient, Felipa Castle, to the [facility] NEUROLOGY CLINIC [place] at [facility]. Please see a copy of my visit note below.    [place], [place]  Botulinum Toxin Procedure    Shelley Moyer MD  Neurology    December 18, 2020    Procedure:  OnabotulinumtoxinA injections for chronic migraine  Indication:  Chronic migraine    Ms. Castle suffers from severe intractable headaches.  She was referred by Dr. Moyer for onabotulinumtoxinA injections for headache.  Risks, benefits, and alternatives were discussed.  All questions were answered and consent given.  She decided to proceed with the injections.     Prior to initiation of botulinum toxin injections, Ms. Castle reported 30 headache days per month, with 30 severe headache days per month. Her headaches are quite disabling and often interfere with her ability to function normally.    Ms. Castle reports 4-5 headache days per month currently, with 4-5 severe headache days per month.  She has not noticed a wearing off phenomenon prior to this round of botulinum toxin injections, lasting 0 weeks.    Previous headache treatments have included topiramate which caused nephrolithiasis, trigger point injections, and hormonal management.     She currently takes no other treatments for headache prevention.  She receives occipital nerve blocks for occipital neuralgia.    Ms. Vizcainos pain was assessed prior to the procedure.  She rated her pain today as 0 out of 10.    Procedural Pause: Procedural pause was conducted to verify correct patient identity, procedure to be performed, correct side and site, correct patient position, and special requirements. Appropriate hand hygiene was utilized, and each injection site was prepped with alcohol wipes or Chloraprep swab.      Procedure Details: From lot number [id number], expiration date 9/2023, 200 units of onabotulinumtoxinA was diluted in 4 mL 0.9% normal saline. A total of 200 units of onabotulinumtoxinA were injected using 30 gauge 0.5 in needles into the muscles listed below. 0 units of onabotulinumtoxinA were wasted.      Injection Sites: Total = 200 units onabotulinumtoxinA       and Procerus muscles - 5 units into the left and right corrugators and 5 units into the procerus (15 units total)     Frontalis muscles - 5 units into the left superior frontalis and 5 units into the right superior frontalis (2 injection sites per muscle) (10 units total)     Temporalis muscles - 25 units into the left temporalis muscle and 25 units into the right temporalis muscle (4 injection sites per muscle) (50 units total)     Occipitalis muscles - 25 units into the left occipitalis muscle and 25 units into the right occipitalis muscle (4 injection sites per muscle) (50 units total)     Splenius Capitis muscles - 12.5 units into the left splenius capitis muscle and 12.5 units into the right splenius capitis muscle (2 injection sites per muscle, divided into 2/3 anteriorly and 1/3 posteriorly) (25 units total)                 Trapezius muscles - 25 units into the left trapezius muscle and 25 units into the right trapezius muscle (3 injection sites per muscle, divided 5 units, 10 units, 10 units, medial to lateral) (50 units total)    Ms. Castle tolerated the procedure well without immediate complications.  She will follow up in clinic for assessment of the effectiveness of treatment.  She did not report any change in her pain level after the botulinumtoxinA injection procedure.    Shelley Moyer MD  Pager: [phone]    Neurology Department  [facility] [place] [id number]

## 2020-12-18 NOTE — PROGRESS NOTES
[place], [place]  Botulinum Toxin Procedure    Shelley Moyer MD  Neurology    December 18, 2020    Procedure:  OnabotulinumtoxinA injections for chronic migraine  Indication:  Chronic migraine    Ms. Castle suffers from severe intractable headaches.  She was referred by Dr. Moyer for onabotulinumtoxinA injections for headache.  Risks, benefits, and alternatives were discussed.  All questions were answered and consent given.  She decided to proceed with the injections.     Prior to initiation of botulinum toxin injections, Ms. Castle reported 30 headache days per month, with 30 severe headache days per month. Her headaches are quite disabling and often interfere with her ability to function normally.    Ms. Castle reports 4-5 headache days per month currently, with 4-5 severe headache days per month.  She has not noticed a wearing off phenomenon prior to this round of botulinum toxin injections, lasting 0 weeks.    Previous headache treatments have included topiramate which caused nephrolithiasis, trigger point injections, and hormonal management.     She currently takes no other treatments for headache prevention.  She receives occipital nerve blocks for occipital neuralgia.    Ms. Vizcainos pain was assessed prior to the procedure.  She rated her pain today as 0 out of 10.    Procedural Pause: Procedural pause was conducted to verify correct patient identity, procedure to be performed, correct side and site, correct patient position, and special requirements. Appropriate hand hygiene was utilized, and each injection site was prepped with alcohol wipes or Chloraprep swab.     Procedure Details: From lot number [id number], expiration date 9/2023, 200 units of onabotulinumtoxinA was diluted in 4 mL 0.9% normal saline. A total of 200 units of onabotulinumtoxinA were injected using 30 gauge 0.5 in needles into the muscles listed below. 0 units of onabotulinumtoxinA were wasted.       Injection Sites: Total = 200 units onabotulinumtoxinA       and Procerus muscles - 5 units into the left and right corrugators and 5 units into the procerus (15 units total)     Frontalis muscles - 5 units into the left superior frontalis and 5 units into the right superior frontalis (2 injection sites per muscle) (10 units total)     Temporalis muscles - 25 units into the left temporalis muscle and 25 units into the right temporalis muscle (4 injection sites per muscle) (50 units total)     Occipitalis muscles - 25 units into the left occipitalis muscle and 25 units into the right occipitalis muscle (4 injection sites per muscle) (50 units total)     Splenius Capitis muscles - 12.5 units into the left splenius capitis muscle and 12.5 units into the right splenius capitis muscle (2 injection sites per muscle, divided into 2/3 anteriorly and 1/3 posteriorly) (25 units total)                 Trapezius muscles - 25 units into the left trapezius muscle and 25 units into the right trapezius muscle (3 injection sites per muscle, divided 5 units, 10 units, 10 units, medial to lateral) (50 units total)    Ms. Castle tolerated the procedure well without immediate complications.  She will follow up in clinic for assessment of the effectiveness of treatment.  She did not report any change in her pain level after the botulinumtoxinA injection procedure.    Shelley Moyer MD  Pager: [phone]    Neurology Department  [facility] [address] [address]

## 2021-03-15 ENCOUNTER — OFFICE VISIT (OUTPATIENT)
Dept: NEUROLOGY | Facility: CLINIC | Age: 46
End: 2021-03-15
Payer: COMMERCIAL

## 2021-03-15 DIAGNOSIS — G43.E09 CHRONIC MIGRAINE WITH AURA: Primary | ICD-10-CM

## 2021-03-15 PROCEDURE — 64615 CHEMODENERV MUSC MIGRAINE: CPT | Performed by: PSYCHIATRY & NEUROLOGY

## 2021-03-15 NOTE — LETTER
3/15/2021       RE: Felipa Castle  [address]  [place] [address]     Dear Colleague,    Thank you for referring your patient, Felipa Castle, to the [facility] NEUROLOGY CLINIC [place] at [facility]. Please see a copy of my visit note below.    [place], [place]  Botulinum Toxin Procedure    Shelley Moyer MD  Neurology    March 15, 2021    Procedure:  OnabotulinumtoxinA injections for chronic migraine  Indication:  Chronic migraine    Ms. Castle suffers from severe intractable headaches.  She was referred by Dr. Moyer for onabotulinumtoxinA injections for headache.  Risks, benefits, and alternatives were discussed.  All questions were answered and consent given.  She decided to proceed with the injections.     Prior to initiation of botulinum toxin injections, Ms. Castle reported 30 headache days per month, with 30 severe headache days per month. Her headaches are quite disabling and often interfere with her ability to function normally.    Ms. Castle reports 4-5 headache days per month currently, with 4-5 severe headache days per month.  She has noticed a wearing off phenomenon prior to this round of botulinum toxin injections, lasting 0 weeks.    Previous headache treatments have included topiramate which caused nephrolithiasis, trigger point injections, and hormonal management.     She currently takes no other treatments for headache prevention.  She receives occipital nerve blocks for occipital neuralgia.    Ms. Vizcainos pain was assessed prior to the procedure.  She rated her pain today as 0 out of 10.    Procedural Pause: Procedural pause was conducted to verify correct patient identity, procedure to be performed, correct side and site, correct patient position, and special requirements. Appropriate hand hygiene was utilized, and each injection site was prepped with alcohol wipes or Chloraprep swab.      Procedure Details: From lot number [id number], expiration date 10/2023, 200 units of onabotulinumtoxinA was diluted in 4 mL 0.9% normal saline, lot # [id number]. A total of 200 units of onabotulinumtoxinA were injected using 30 gauge 0.5 in needles into the muscles listed below. 0 units of onabotulinumtoxinA were wasted.      Injection Sites: Total = 200 units onabotulinumtoxinA       and Procerus muscles - 5 units into the left and right corrugators and 5 units into the procerus (15 units total) ** moved anterior to avoid drooping     Frontalis muscles - 5 units into the left superior frontalis and 5 units into the right superior frontalis (2 injection sites per muscle) (10 units total)     Temporalis muscles - 25 units into the left temporalis muscle and 25 units into the right temporalis muscle (4 injection sites per muscle) (50 units total)     Occipitalis muscles - 25 units into the left occipitalis muscle and 25 units into the right occipitalis muscle (4 injection sites per muscle) (50 units total)     Splenius Capitis muscles - 12.5 units into the left splenius capitis muscle and 12.5 units into the right splenius capitis muscle (2 injection sites per muscle, divided into 2/3 anteriorly and 1/3 posteriorly) (25 units total)                 Trapezius muscles - 25 units into the left trapezius muscle and 25 units into the right trapezius muscle (3 injection sites per muscle, divided 5 units, 10 units, 10 units, medial to lateral) (50 units total)    Ms. Castle tolerated the procedure well without immediate complications.  She will follow up in clinic for assessment of the effectiveness of treatment.  She did not report any change in her pain level after the botulinumtoxinA injection procedure.    Shelley Moyer MD  Pager: [phone]    Neurology Department  [facility] [address] [address]

## 2021-03-15 NOTE — PROGRESS NOTES
[place], [place]  Botulinum Toxin Procedure    Shelley Moyer MD  Neurology    March 15, 2021    Procedure:  OnabotulinumtoxinA injections for chronic migraine  Indication:  Chronic migraine    Ms. Castle suffers from severe intractable headaches.  She was referred by Dr. Moyer for onabotulinumtoxinA injections for headache.  Risks, benefits, and alternatives were discussed.  All questions were answered and consent given.  She decided to proceed with the injections.     Prior to initiation of botulinum toxin injections, Ms. Castle reported 30 headache days per month, with 30 severe headache days per month. Her headaches are quite disabling and often interfere with her ability to function normally.    Ms. Castle reports 4-5 headache days per month currently, with 4-5 severe headache days per month.  She has noticed a wearing off phenomenon prior to this round of botulinum toxin injections, lasting 0 weeks.    Previous headache treatments have included topiramate which caused nephrolithiasis, trigger point injections, and hormonal management.     She currently takes no other treatments for headache prevention.  She receives occipital nerve blocks for occipital neuralgia.    Ms. Vizcainos pain was assessed prior to the procedure.  She rated her pain today as 0 out of 10.    Procedural Pause: Procedural pause was conducted to verify correct patient identity, procedure to be performed, correct side and site, correct patient position, and special requirements. Appropriate hand hygiene was utilized, and each injection site was prepped with alcohol wipes or Chloraprep swab.     Procedure Details: From lot number [id number], expiration date 10/2023, 200 units of onabotulinumtoxinA was diluted in 4 mL 0.9% normal saline, lot # [id number]. A total of 200 units of onabotulinumtoxinA were injected using 30 gauge 0.5 in needles into the muscles listed below. 0 units of onabotulinumtoxinA were  wasted.      Injection Sites: Total = 200 units onabotulinumtoxinA       and Procerus muscles - 5 units into the left and right corrugators and 5 units into the procerus (15 units total) ** moved anterior to avoid drooping     Frontalis muscles - 5 units into the left superior frontalis and 5 units into the right superior frontalis (2 injection sites per muscle) (10 units total)     Temporalis muscles - 25 units into the left temporalis muscle and 25 units into the right temporalis muscle (4 injection sites per muscle) (50 units total)     Occipitalis muscles - 25 units into the left occipitalis muscle and 25 units into the right occipitalis muscle (4 injection sites per muscle) (50 units total)     Splenius Capitis muscles - 12.5 units into the left splenius capitis muscle and 12.5 units into the right splenius capitis muscle (2 injection sites per muscle, divided into 2/3 anteriorly and 1/3 posteriorly) (25 units total)                 Trapezius muscles - 25 units into the left trapezius muscle and 25 units into the right trapezius muscle (3 injection sites per muscle, divided 5 units, 10 units, 10 units, medial to lateral) (50 units total)    Ms. Castle tolerated the procedure well without immediate complications.  She will follow up in clinic for assessment of the effectiveness of treatment.  She did not report any change in her pain level after the botulinumtoxinA injection procedure.    Shelley Moyer MD  Pager: [phone]    Neurology Department  [facility]note 2[facility] [address] [address]

## 2021-06-11 ENCOUNTER — OFFICE VISIT (OUTPATIENT)
Dept: NEUROLOGY | Facility: CLINIC | Age: 46
End: 2021-06-11
Payer: COMMERCIAL

## 2021-06-11 DIAGNOSIS — G43.E09 CHRONIC MIGRAINE WITH AURA: Primary | ICD-10-CM

## 2021-06-11 PROCEDURE — 64615 CHEMODENERV MUSC MIGRAINE: CPT | Performed by: PSYCHIATRY & NEUROLOGY

## 2021-06-11 NOTE — PROGRESS NOTES
[place], [place]  Botulinum Toxin Procedure    Shelley Moyer MD  Neurology    June 11, 2021    Procedure:  OnabotulinumtoxinA injections for chronic migraine  Indication:  Chronic migraine    Ms. Castle suffers from severe intractable headaches.  She was referred by Dr. Moyer for onabotulinumtoxinA injections for headache.  Risks, benefits, and alternatives were discussed.  All questions were answered and consent given.  She decided to proceed with the injections.     Prior to initiation of botulinum toxin injections, Ms. Castle reported 30 headache days per month, with 30 severe headache days per month. Her headaches are quite disabling and often interfere with her ability to function normally.    Ms. Castle reports 4-5 headache days per month currently, with 4-5 severe headache days per month.  She has noticed a wearing off phenomenon prior to this round of botulinum toxin injections, lasting 0 weeks.    Previous headache treatments have included topiramate which caused nephrolithiasis, trigger point injections, and hormonal management. She previously received occipital nerve blocks for occipital neuralgia before Botox.     She currently takes no other treatments for headache prevention.      Ms. Vizcainos pain was assessed prior to the procedure.  She rated her pain today as 0 out of 10.    Procedural Pause: Procedural pause was conducted to verify correct patient identity, procedure to be performed, correct side and site, correct patient position, and special requirements. Appropriate hand hygiene was utilized, and each injection site was prepped with alcohol wipes or Chloraprep swab.     Procedure Details: From lot number [id number], expiration date 3/2024, 200 units of onabotulinumtoxinA was diluted in 4 mL 0.9% normal saline, lot # -[id number]. A total of 200 units of onabotulinumtoxinA were injected using 30 gauge 0.5 in needles into the muscles listed below. 0 units of  onabotulinumtoxinA were wasted.      Injection Sites: Total = 200 units onabotulinumtoxinA      **  and Procerus muscles - 5 units into the left and right corrugators and 5 units into the procerus (15 units total) ** moved anterior to avoid drooping     Frontalis muscles - 5 units into the left superior frontalis and 5 units into the right superior frontalis (2 injection sites per muscle) (10 units total)     Temporalis muscles - 25 units into the left temporalis muscle and 25 units into the right temporalis muscle (4 injection sites per muscle) (50 units total)     Occipitalis muscles - 25 units into the left occipitalis muscle and 25 units into the right occipitalis muscle (4 injection sites per muscle) (50 units total)     Splenius Capitis muscles - 12.5 units into the left splenius capitis muscle and 12.5 units into the right splenius capitis muscle (2 injection sites per muscle, divided into 2/3 anteriorly and 1/3 posteriorly) (25 units total)                 Trapezius muscles - 25 units into the left trapezius muscle and 25 units into the right trapezius muscle (3 injection sites per muscle, divided 5 units, 10 units, 10 units, medial to lateral) (50 units total)     Ms. Castle tolerated the procedure well without immediate complications.  She will follow up in clinic for assessment of the effectiveness of treatment.  She did not report any change in her pain level after the botulinumtoxinA injection procedure.    Shelley Moyer MD  Pager: [phone]    Neurology Department  [facility] [place] [id number]

## 2021-06-11 NOTE — LETTER
6/11/2021       RE: Felipa Castle  [address]  [place] [address]     Dear Colleague,    Thank you for referring your patient, Felipa Castle, to the [facility] NEUROLOGY CLINIC [place] at [facility]. Please see a copy of my visit note below.    [place], [place]  Botulinum Toxin Procedure    Shelley Moyer MD  Neurology    June 11, 2021    Procedure:  OnabotulinumtoxinA injections for chronic migraine  Indication:  Chronic migraine    Ms. Castle suffers from severe intractable headaches.  She was referred by Dr. Moyer for onabotulinumtoxinA injections for headache.  Risks, benefits, and alternatives were discussed.  All questions were answered and consent given.  She decided to proceed with the injections.     Prior to initiation of botulinum toxin injections, Ms. Castle reported 30 headache days per month, with 30 severe headache days per month. Her headaches are quite disabling and often interfere with her ability to function normally.    Ms. Castle reports 4-5 headache days per month currently, with 4-5 severe headache days per month.  She has noticed a wearing off phenomenon prior to this round of botulinum toxin injections, lasting 0 weeks.    Previous headache treatments have included topiramate which caused nephrolithiasis, trigger point injections, and hormonal management. She previously received occipital nerve blocks for occipital neuralgia before Botox.     She currently takes no other treatments for headache prevention.      Ms. Vizcainos pain was assessed prior to the procedure.  She rated her pain today as 0 out of 10.    Procedural Pause: Procedural pause was conducted to verify correct patient identity, procedure to be performed, correct side and site, correct patient position, and special requirements. Appropriate hand hygiene was utilized, and each injection site was prepped with alcohol wipes  or Chloraprep swab.     Procedure Details: From lot number [id number], expiration date 3/2024, 200 units of onabotulinumtoxinA was diluted in 4 mL 0.9% normal saline, lot # -[id number]. A total of 200 units of onabotulinumtoxinA were injected using 30 gauge 0.5 in needles into the muscles listed below. 0 units of onabotulinumtoxinA were wasted.      Injection Sites: Total = 200 units onabotulinumtoxinA      **  and Procerus muscles - 5 units into the left and right corrugators and 5 units into the procerus (15 units total) ** moved anterior to avoid drooping     Frontalis muscles - 5 units into the left superior frontalis and 5 units into the right superior frontalis (2 injection sites per muscle) (10 units total)     Temporalis muscles - 25 units into the left temporalis muscle and 25 units into the right temporalis muscle (4 injection sites per muscle) (50 units total)     Occipitalis muscles - 25 units into the left occipitalis muscle and 25 units into the right occipitalis muscle (4 injection sites per muscle) (50 units total)     Splenius Capitis muscles - 12.5 units into the left splenius capitis muscle and 12.5 units into the right splenius capitis muscle (2 injection sites per muscle, divided into 2/3 anteriorly and 1/3 posteriorly) (25 units total)                 Trapezius muscles - 25 units into the left trapezius muscle and 25 units into the right trapezius muscle (3 injection sites per muscle, divided 5 units, 10 units, 10 units, medial to lateral) (50 units total)     Ms. Castle tolerated the procedure well without immediate complications.  She will follow up in clinic for assessment of the effectiveness of treatment.  She did not report any change in her pain level after the botulinumtoxinA injection procedure.    Shelley Moyer MD  Pager: [phone]    Neurology Department  [facility] and Surgery [address] [address]        Again, thank you  for allowing me to participate in the care of your patient.      Sincerely,    Shelley Moyer MD

## 2021-09-03 ENCOUNTER — OFFICE VISIT (OUTPATIENT)
Dept: NEUROLOGY | Facility: CLINIC | Age: 46
End: 2021-09-03
Payer: COMMERCIAL

## 2021-09-03 DIAGNOSIS — G43.E09 CHRONIC MIGRAINE WITH AURA: Primary | ICD-10-CM

## 2021-09-03 PROCEDURE — 64615 CHEMODENERV MUSC MIGRAINE: CPT | Performed by: PSYCHIATRY & NEUROLOGY

## 2021-09-03 NOTE — PROGRESS NOTES
[place], [place]  Botulinum Toxin Procedure    Shelley Moyer MD  Neurology    September 3, 2021    Procedure:  OnabotulinumtoxinA injections for chronic migraine  Indication:  Chronic migraine    Ms. Castle suffers from severe intractable headaches.  She was referred by Dr. Moyer for onabotulinumtoxinA injections for headache.  Risks, benefits, and alternatives were discussed.  All questions were answered and consent given.  She decided to proceed with the injections.     Prior to initiation of botulinum toxin injections, Ms. Castle reported 30 headache days per month, with 30 severe headache days per month. Her headaches are quite disabling and often interfere with her ability to function normally.    Ms. Castle reports 4-5 headache days per month currently, with 3 severe headache days per month.  She has noticed a wearing off phenomenon prior to this round of botulinum toxin injections, lasting 0 weeks.    Previous headache treatments have included topiramate which caused nephrolithiasis, trigger point injections, and hormonal management. She previously received occipital nerve blocks for occipital neuralgia before Botox.     She currently takes no other treatments for headache prevention.    Ms. Vizcainos pain was assessed prior to the procedure.  She rated her pain today as mild out of 10.    Procedural Pause: Procedural pause was conducted to verify correct patient identity, procedure to be performed, correct side and site, correct patient position, and special requirements. Appropriate hand hygiene was utilized, and each injection site was prepped with alcohol wipes or Chloraprep swab.     Procedure Details: 200 units of onabotulinumtoxinA was diluted in 4 mL 0.9% normal saline. A total of 200 units of onabotulinumtoxinA were injected using 30 gauge 0.5 in needles into the muscles listed below. 0 units of onabotulinumtoxinA were wasted.      Injection Sites: Total  = 200 units onabotulinumtoxinA      **  and Procerus muscles - 5 units into the left and right corrugators and 5 units into the procerus (15 units total) ** moved anterior to avoid drooping     Frontalis muscles - 5 units into the left superior frontalis and 5 units into the right superior frontalis (2 injection sites per muscle) (10 units total)     Temporalis muscles - 25 units into the left temporalis muscle and 25 units into the right temporalis muscle (4 injection sites per muscle) (50 units total)     Occipitalis muscles - 25 units into the left occipitalis muscle and 25 units into the right occipitalis muscle (4 injection sites per muscle) (50 units total)     Splenius Capitis muscles - 12.5 units into the left splenius capitis muscle and 12.5 units into the right splenius capitis muscle (2 injection sites per muscle, divided into 2/3 anteriorly and 1/3 posteriorly) (25 units total)                 Trapezius muscles - 25 units into the left trapezius muscle and 25 units into the right trapezius muscle (3 injection sites per muscle, divided 5 units, 10 units, 10 units, medial to lateral) (50 units total)    Ms. Castle tolerated the procedure well without immediate complications.  She will follow up in clinic for assessment of the effectiveness of treatment.  She did not report any change in her pain level after the botulinumtoxinA injection procedure.    Shelley Moyer MD  Pager: [phone]    Neurology Department  [facility] and Surgery [address] [address]

## 2021-09-03 NOTE — LETTER
9/3/2021       RE: Felipa Castle  [address]  [place] [address]     Dear Colleague,    Thank you for referring your patient, Felipa Castle, to the [facility] NEUROLOGY CLINIC [place] at [facility]. Please see a copy of my visit note below.    [place], [place]  Botulinum Toxin Procedure    Shelley Moyer MD  Neurology    September 3, 2021    Procedure:  OnabotulinumtoxinA injections for chronic migraine  Indication:  Chronic migraine    Ms. Castle suffers from severe intractable headaches.  She was referred by Dr. Moyer for onabotulinumtoxinA injections for headache.  Risks, benefits, and alternatives were discussed.  All questions were answered and consent given.  She decided to proceed with the injections.     Prior to initiation of botulinum toxin injections, Ms. Castle reported 30 headache days per month, with 30 severe headache days per month. Her headaches are quite disabling and often interfere with her ability to function normally.    Ms. Castle reports 4-5 headache days per month currently, with 3 severe headache days per month.  She has noticed a wearing off phenomenon prior to this round of botulinum toxin injections, lasting 0 weeks.    Previous headache treatments have included topiramate which caused nephrolithiasis, trigger point injections, and hormonal management. She previously received occipital nerve blocks for occipital neuralgia before Botox.     She currently takes no other treatments for headache prevention.    Ms. Vizcainos pain was assessed prior to the procedure.  She rated her pain today as mild out of 10.    Procedural Pause: Procedural pause was conducted to verify correct patient identity, procedure to be performed, correct side and site, correct patient position, and special requirements. Appropriate hand hygiene was utilized, and each injection site was prepped with alcohol  wipes or Chloraprep swab.     Procedure Details: 200 units of onabotulinumtoxinA was diluted in 4 mL 0.9% normal saline. A total of 200 units of onabotulinumtoxinA were injected using 30 gauge 0.5 in needles into the muscles listed below. 0 units of onabotulinumtoxinA were wasted.      Injection Sites: Total = 200 units onabotulinumtoxinA      **  and Procerus muscles - 5 units into the left and right corrugators and 5 units into the procerus (15 units total) ** moved anterior to avoid drooping     Frontalis muscles - 5 units into the left superior frontalis and 5 units into the right superior frontalis (2 injection sites per muscle) (10 units total)     Temporalis muscles - 25 units into the left temporalis muscle and 25 units into the right temporalis muscle (4 injection sites per muscle) (50 units total)     Occipitalis muscles - 25 units into the left occipitalis muscle and 25 units into the right occipitalis muscle (4 injection sites per muscle) (50 units total)     Splenius Capitis muscles - 12.5 units into the left splenius capitis muscle and 12.5 units into the right splenius capitis muscle (2 injection sites per muscle, divided into 2/3 anteriorly and 1/3 posteriorly) (25 units total)                 Trapezius muscles - 25 units into the left trapezius muscle and 25 units into the right trapezius muscle (3 injection sites per muscle, divided 5 units, 10 units, 10 units, medial to lateral) (50 units total)    Ms. Castle tolerated the procedure well without immediate complications.  She will follow up in clinic for assessment of the effectiveness of treatment.  She did not report any change in her pain level after the botulinumtoxinA injection procedure.    Shelley Moyer MD  Pager: [phone]    Neurology Department  [facility] Surgery [address] [address]        Again, thank you for allowing me to participate in the care of your patient.       Sincerely,    Shelley Moyer MD

## 2021-11-20 ENCOUNTER — HEALTH MAINTENANCE LETTER (OUTPATIENT)
Age: 46
End: 2021-11-20

## 2021-11-29 ENCOUNTER — OFFICE VISIT (OUTPATIENT)
Dept: NEUROLOGY | Facility: CLINIC | Age: 46
End: 2021-11-29
Payer: COMMERCIAL

## 2021-11-29 DIAGNOSIS — G43.E09 CHRONIC MIGRAINE WITH AURA: Primary | ICD-10-CM

## 2021-11-29 PROCEDURE — 64615 CHEMODENERV MUSC MIGRAINE: CPT | Performed by: PSYCHIATRY & NEUROLOGY

## 2021-11-29 NOTE — PROGRESS NOTES
[facility] NEUROLOGY CLINIC [address] [address] [phone]  Dept: [phone]  ______________________________________________________________________________    Patient: Felipa Castle   : 1975   MRN: [id number]   2021    INVASIVE PROCEDURE SAFETY CHECKLIST    Date: 2021   Procedure: Botox injection per chronic migraine protocol  Patient Name: Felipa Castle  MRN: [id number]  YOB: 1975    Action: Complete sections as appropriate. Any discrepancy results in a HARD COPY until resolved.     PRE PROCEDURE:  Patient ID verified with 2 identifiers (name and  or MRN): Yes  Procedure and site verified with patient/designee (when able): Yes  Accurate consent documentation in medical record: Yes  H&P (or appropriate assessment) documented in medical record: Yes  H&P must be up to 20 days prior to procedure and updates within 24 hours of procedure as applicable: NA  Relevant diagnostic and radiology test results appropriately labeled and displayed as applicable: NA  Procedure site(s) marked with provider initials: NA    TIMEOUT:  Time-Out performed immediately prior to starting procedure, including verbal and active participation of all team members addressing the following:Yes  * Correct patient identify  * Confirmed that the correct side and site are marked  * An accurate procedure consent form  * Agreement on the procedure to be done  * Correct patient position  * Relevant images and results are properly labeled and appropriately displayed  * The need to administer antibiotics or fluids for irrigation purposes during the procedure as applicable   * Safety precautions based on patient history or medication use    DURING PROCEDURE: Verification of correct person, site, and procedures any time the responsibility for care of the patient is transferred to another member of the care team.       BOTULINUM  NEUROTOXIN INJECTION PROCEDURES:  DATA:11/29/2021  INDICATIONS FOR PROCEDURES:   chronic migraine headaches.   baseline symptoms have been recalcitrant to oral medications and conservative therapy.  No problems reported since last Botox injections on 9/3/2021. Yet to feel the effect of increased Botox dose may need more rounds to feel the effect. Previous headache treatments have included topiramate which caused nephrolithiasis, trigger point injections, and hormonal management. She previously received occipital nerve blocks for occipital neuralgia before Botox.  Patient is here today for a repeat injection with Botox.    GOAL OF PROCEDURE:  The goal of this procedure is to decrease pain and enhance functional independence.    TOTAL DOSE ADMINISTERED:  Dose Administered:  200 units  Botox (Botulinum Toxin Type A)        Wasted 45 units    CONSENT:  The risks, benefits, and treatment options were discussed with the patient who agreed to proceed.    Written consent was obtained     EQUIPMENT USED:  Needles-30 gauge, 0.5 inches for injections  Four 1-ml tuberculin syringes for injections  One sodium chloride 10 ml vial preservative free  Alcohol swabs    SKIN PREPARATION:  Skin preparation was performed using an alcohol wipe.      AREA/MUSCLE INJECTED:  200units of Botox  Right upper Trapezius (upper cervical) - 5, 10, 10  units of Botox at 3 site/s.   Left upper Trapezius (upper cervical) - 5, 10, 10 units of Botox at 3 site/s.     Right cervical paraspinals - 12.5 units of Botox at 2 site/s.   Left cervical paraspinals - 12.5 units of Botox at 2 site/s.     Left occipitalis - 5 units of Botox at 4 site/s.  Right occipitalis -5 units of Botox at 4 site/s    Right Frontalis - 5 units of Botox at 2 site/s.  Left Frontalis - 5 units of Botox at 2 site/s.    Right Temporalis - 5 units of Botox at 5 site/s  Left Temporalis - 5 units of Botox at 5 site/s    Right  - 5 units of Botox at 1 site/s.              Left   - 5 units of Botox at 1 site/s.    Procerus - 5 units of Botox at 1 site/s.    RESPONSE TO PROCEDURE:  tolerated the procedure well and there were no immediate complications.  Patient was allowed to recover for an appropriate period of time and was discharged home in stable condition.    FOLLOW UP:    Repeat Botox injections in 12 weeks      This procedure was performed under a hospital privileging agreement with BALDEMAR Brewer, [facility]

## 2021-11-29 NOTE — LETTER
2021      RE: Felipa Castle   [address] [address]  [facility] [address]       [facility] NEUROLOGY [address] [address] [phone]  Dept: [phone]  ______________________________________________________________________________    Patient: Felipa Castle   : 1975   MRN: [id number]   2021    INVASIVE PROCEDURE SAFETY CHECKLIST    Date: 2021   Procedure: Botox injection per chronic migraine protocol  Patient Name: Felipa Castle  MRN: [id number]  YOB: 1975    Action: Complete sections as appropriate. Any discrepancy results in a HARD COPY until resolved.     PRE PROCEDURE:  Patient ID verified with 2 identifiers (name and  or MRN): Yes  Procedure and site verified with patient/designee (when able): Yes  Accurate consent documentation in medical record: Yes  H&P (or appropriate assessment) documented in medical record: Yes  H&P must be up to 20 days prior to procedure and updates within 24 hours of procedure as applicable: NA  Relevant diagnostic and radiology test results appropriately labeled and displayed as applicable: NA  Procedure site(s) marked with provider initials: NA    TIMEOUT:  Time-Out performed immediately prior to starting procedure, including verbal and active participation of all team members addressing the following:Yes  * Correct patient identify  * Confirmed that the correct side and site are marked  * An accurate procedure consent form  * Agreement on the procedure to be done  * Correct patient position  * Relevant images and results are properly labeled and appropriately displayed  * The need to administer antibiotics or fluids for irrigation purposes during the procedure as applicable   * Safety precautions based on patient history or medication use    DURING PROCEDURE: Verification of correct person, site, and procedures any time the responsibility for care of the  patient is transferred to another member of the care team.       BOTULINUM NEUROTOXIN INJECTION PROCEDURES:  DATA:11/29/2021  INDICATIONS FOR PROCEDURES:   chronic migraine headaches.   baseline symptoms have been recalcitrant to oral medications and conservative therapy.  No problems reported since last Botox injections on 9/3/2021. Yet to feel the effect of increased Botox dose may need more rounds to feel the effect. Previous headache treatments have included topiramate which caused nephrolithiasis, trigger point injections, and hormonal management. She previously received occipital nerve blocks for occipital neuralgia before Botox.  Patient is here today for a repeat injection with Botox.    GOAL OF PROCEDURE:  The goal of this procedure is to decrease pain and enhance functional independence.    TOTAL DOSE ADMINISTERED:  Dose Administered:  200 units  Botox (Botulinum Toxin Type A)        Wasted 45 units    CONSENT:  The risks, benefits, and treatment options were discussed with the patient who agreed to proceed.    Written consent was obtained     EQUIPMENT USED:  Needles-30 gauge, 0.5 inches for injections  Four 1-ml tuberculin syringes for injections  One sodium chloride 10 ml vial preservative free  Alcohol swabs    SKIN PREPARATION:  Skin preparation was performed using an alcohol wipe.      AREA/MUSCLE INJECTED:  200units of Botox  Right upper Trapezius (upper cervical) - 5, 10, 10  units of Botox at 3 site/s.   Left upper Trapezius (upper cervical) - 5, 10, 10 units of Botox at 3 site/s.     Right cervical paraspinals - 12.5 units of Botox at 2 site/s.   Left cervical paraspinals - 12.5 units of Botox at 2 site/s.     Left occipitalis - 5 units of Botox at 4 site/s.  Right occipitalis -5 units of Botox at 4 site/s    Right Frontalis - 5 units of Botox at 2 site/s.  Left Frontalis - 5 units of Botox at 2 site/s.    Right Temporalis - 5 units of Botox at 5 site/s  Left Temporalis - 5 units of Botox at 5  site/s    Right  - 5 units of Botox at 1 site/s.              Left  - 5 units of Botox at 1 site/s.    Procerus - 5 units of Botox at 1 site/s.    RESPONSE TO PROCEDURE:  tolerated the procedure well and there were no immediate complications.  Patient was allowed to recover for an appropriate period of time and was discharged home in stable condition.    FOLLOW UP:    Repeat Botox injections in 12 weeks      This procedure was performed under a hospital privileging agreement with BALDEMAR Brewer, [facility]

## 2021-11-29 NOTE — LETTER
2021       RE: Felipa Castle  [address] [address]  [facility] [address]     Dear Colleague,    Thank you for referring your patient, Felipa Castle, to the [facility] [place] at [facility]. Please see a copy of my visit note below.    [facility] [address] [id number] [phone]  Dept: [phone]  ______________________________________________________________________________    Patient: Felipa Castle   : 1975   MRN: [id number]   2021    INVASIVE PROCEDURE SAFETY CHECKLIST    Date: 2021   Procedure: Botox injection per chronic migraine protocol  Patient Name: Felipa Castle  MRN: [id number]  YOB: 1975    Action: Complete sections as appropriate. Any discrepancy results in a HARD COPY until resolved.     PRE PROCEDURE:  Patient ID verified with 2 identifiers (name and  or MRN): Yes  Procedure and site verified with patient/designee (when able): Yes  Accurate consent documentation in medical record: Yes  H&P (or appropriate assessment) documented in medical record: Yes  H&P must be up to 20 days prior to procedure and updates within 24 hours of procedure as applicable: NA  Relevant diagnostic and radiology test results appropriately labeled and displayed as applicable: NA  Procedure site(s) marked with provider initials: NA    TIMEOUT:  Time-Out performed immediately prior to starting procedure, including verbal and active participation of all team members addressing the following:Yes  * Correct patient identify  * Confirmed that the correct side and site are marked  * An accurate procedure consent form  * Agreement on the procedure to be done  * Correct patient position  * Relevant images and results are properly labeled and appropriately displayed  * The need to administer antibiotics or fluids for  irrigation purposes during the procedure as applicable   * Safety precautions based on patient history or medication use    DURING PROCEDURE: Verification of correct person, site, and procedures any time the responsibility for care of the patient is transferred to another member of the care team.       BOTULINUM NEUROTOXIN INJECTION PROCEDURES:  DATA:11/29/2021  INDICATIONS FOR PROCEDURES:   chronic migraine headaches.   baseline symptoms have been recalcitrant to oral medications and conservative therapy.  No problems reported since last Botox injections on 9/3/2021. Yet to feel the effect of increased Botox dose may need more rounds to feel the effect. Previous headache treatments have included topiramate which caused nephrolithiasis, trigger point injections, and hormonal management. She previously received occipital nerve blocks for occipital neuralgia before Botox.  Patient is here today for a repeat injection with Botox.    GOAL OF PROCEDURE:  The goal of this procedure is to decrease pain and enhance functional independence.    TOTAL DOSE ADMINISTERED:  Dose Administered:  200 units  Botox (Botulinum Toxin Type A)        Wasted 45 units    CONSENT:  The risks, benefits, and treatment options were discussed with the patient who agreed to proceed.    Written consent was obtained     EQUIPMENT USED:  Needles-30 gauge, 0.5 inches for injections  Four 1-ml tuberculin syringes for injections  One sodium chloride 10 ml vial preservative free  Alcohol swabs    SKIN PREPARATION:  Skin preparation was performed using an alcohol wipe.      AREA/MUSCLE INJECTED:  200units of Botox  Right upper Trapezius (upper cervical) - 5, 10, 10  units of Botox at 3 site/s.   Left upper Trapezius (upper cervical) - 5, 10, 10 units of Botox at 3 site/s.     Right cervical paraspinals - 12.5 units of Botox at 2 site/s.   Left cervical paraspinals - 12.5 units of Botox at 2 site/s.     Left occipitalis - 5 units of Botox at 4  site/s.  Right occipitalis -5 units of Botox at 4 site/s    Right Frontalis - 5 units of Botox at 2 site/s.  Left Frontalis - 5 units of Botox at 2 site/s.    Right Temporalis - 5 units of Botox at 5 site/s  Left Temporalis - 5 units of Botox at 5 site/s    Right  - 5 units of Botox at 1 site/s.              Left  - 5 units of Botox at 1 site/s.    Procerus - 5 units of Botox at 1 site/s.    RESPONSE TO PROCEDURE:  tolerated the procedure well and there were no immediate complications.  Patient was allowed to recover for an appropriate period of time and was discharged home in stable condition.    FOLLOW UP:    Repeat Botox injections in 12 weeks      This procedure was performed under a hospital privileging agreement with BALDEMAR Brewer, CNP  [facility]                Again, thank you for allowing me to participate in the care of your patient.      Sincerely,    BALDEMAR Champion CNP

## 2022-12-26 ENCOUNTER — HEALTH MAINTENANCE LETTER (OUTPATIENT)
Age: 47
End: 2022-12-26

## 2024-02-04 ENCOUNTER — HEALTH MAINTENANCE LETTER (OUTPATIENT)
Age: 49
End: 2024-02-04